# Patient Record
Sex: FEMALE | Race: WHITE | Employment: PART TIME | URBAN - NONMETROPOLITAN AREA
[De-identification: names, ages, dates, MRNs, and addresses within clinical notes are randomized per-mention and may not be internally consistent; named-entity substitution may affect disease eponyms.]

---

## 2023-02-20 ENCOUNTER — HOSPITAL ENCOUNTER (INPATIENT)
Age: 73
LOS: 1 days | Discharge: HOME | DRG: 343 | End: 2023-02-22
Attending: PEDIATRICS | Admitting: SURGERY
Payer: MEDICARE

## 2023-02-20 DIAGNOSIS — K35.80 ACUTE APPENDICITIS, UNSPECIFIED ACUTE APPENDICITIS TYPE: Primary | ICD-10-CM

## 2023-02-20 LAB
ALBUMIN SERPL-MCNC: 4.1 G/DL (ref 3.5–5.2)
ALP BLD-CCNC: 76 U/L (ref 35–104)
ALT SERPL-CCNC: 15 U/L (ref 5–33)
ANION GAP SERPL CALCULATED.3IONS-SCNC: 12 MMOL/L (ref 7–19)
AST SERPL-CCNC: 15 U/L (ref 5–32)
BASOPHILS ABSOLUTE: 0 K/UL (ref 0–0.2)
BASOPHILS RELATIVE PERCENT: 0.4 % (ref 0–1)
BILIRUB SERPL-MCNC: 0.7 MG/DL (ref 0.2–1.2)
BUN BLDV-MCNC: 8 MG/DL (ref 8–23)
CALCIUM SERPL-MCNC: 9.7 MG/DL (ref 8.8–10.2)
CHLORIDE BLD-SCNC: 102 MMOL/L (ref 98–111)
CO2: 23 MMOL/L (ref 22–29)
CREAT SERPL-MCNC: 0.7 MG/DL (ref 0.5–0.9)
EOSINOPHILS ABSOLUTE: 0 K/UL (ref 0–0.6)
EOSINOPHILS RELATIVE PERCENT: 0.4 % (ref 0–5)
GFR SERPL CREATININE-BSD FRML MDRD: >60 ML/MIN/{1.73_M2}
GLUCOSE BLD-MCNC: 109 MG/DL (ref 74–109)
HCT VFR BLD CALC: 48.9 % (ref 37–47)
HEMOGLOBIN: 16.4 G/DL (ref 12–16)
IMMATURE GRANULOCYTES #: 0 K/UL
LIPASE: 18 U/L (ref 13–60)
LYMPHOCYTES ABSOLUTE: 1.5 K/UL (ref 1.1–4.5)
LYMPHOCYTES RELATIVE PERCENT: 14.5 % (ref 20–40)
MCH RBC QN AUTO: 29 PG (ref 27–31)
MCHC RBC AUTO-ENTMCNC: 33.5 G/DL (ref 33–37)
MCV RBC AUTO: 86.5 FL (ref 81–99)
MONOCYTES ABSOLUTE: 0.6 K/UL (ref 0–0.9)
MONOCYTES RELATIVE PERCENT: 5.5 % (ref 0–10)
NEUTROPHILS ABSOLUTE: 8.2 K/UL (ref 1.5–7.5)
NEUTROPHILS RELATIVE PERCENT: 78.9 % (ref 50–65)
PDW BLD-RTO: 14.2 % (ref 11.5–14.5)
PLATELET # BLD: 228 K/UL (ref 130–400)
PMV BLD AUTO: 9.8 FL (ref 9.4–12.3)
POTASSIUM SERPL-SCNC: 4.3 MMOL/L (ref 3.5–5)
RBC # BLD: 5.65 M/UL (ref 4.2–5.4)
SODIUM BLD-SCNC: 137 MMOL/L (ref 136–145)
TOTAL PROTEIN: 7.2 G/DL (ref 6.6–8.7)
WBC # BLD: 10.4 K/UL (ref 4.8–10.8)

## 2023-02-20 PROCEDURE — 36415 COLL VENOUS BLD VENIPUNCTURE: CPT

## 2023-02-20 PROCEDURE — 80053 COMPREHEN METABOLIC PANEL: CPT

## 2023-02-20 PROCEDURE — 85025 COMPLETE CBC W/AUTO DIFF WBC: CPT

## 2023-02-20 PROCEDURE — 83690 ASSAY OF LIPASE: CPT

## 2023-02-20 PROCEDURE — 99285 EMERGENCY DEPT VISIT HI MDM: CPT

## 2023-02-20 RX ORDER — ERGOCALCIFEROL 1.25 MG/1
50000 CAPSULE ORAL WEEKLY
COMMUNITY

## 2023-02-20 RX ORDER — LORATADINE 10 MG/1
10 CAPSULE, LIQUID FILLED ORAL DAILY
COMMUNITY

## 2023-02-20 RX ORDER — ATORVASTATIN CALCIUM 10 MG/1
10 TABLET, FILM COATED ORAL DAILY
COMMUNITY

## 2023-02-21 ENCOUNTER — ANESTHESIA EVENT (OUTPATIENT)
Dept: OPERATING ROOM | Age: 73
End: 2023-02-21
Payer: MEDICARE

## 2023-02-21 ENCOUNTER — ANESTHESIA (OUTPATIENT)
Dept: OPERATING ROOM | Age: 73
End: 2023-02-21
Payer: MEDICARE

## 2023-02-21 ENCOUNTER — APPOINTMENT (OUTPATIENT)
Dept: CT IMAGING | Age: 73
DRG: 343 | End: 2023-02-21
Payer: MEDICARE

## 2023-02-21 PROBLEM — K35.80 ACUTE APPENDICITIS: Status: ACTIVE | Noted: 2023-02-21

## 2023-02-21 LAB
BACTERIA: NEGATIVE /HPF
BILIRUBIN URINE: NEGATIVE
BLOOD, URINE: NEGATIVE
CLARITY: ABNORMAL
COLOR: YELLOW
CRYSTALS, UA: ABNORMAL /HPF
EPITHELIAL CELLS, UA: 0 /HPF (ref 0–5)
GLUCOSE URINE: NEGATIVE MG/DL
HYALINE CASTS: 2 /HPF (ref 0–8)
KETONES, URINE: ABNORMAL MG/DL
LEUKOCYTE ESTERASE, URINE: ABNORMAL
NITRITE, URINE: NEGATIVE
PH UA: 6.5 (ref 5–8)
PROTEIN UA: NEGATIVE MG/DL
RBC UA: 1 /HPF (ref 0–4)
SARS-COV-2, NAAT: NOT DETECTED
SPECIFIC GRAVITY UA: 1.03 (ref 1–1.03)
UROBILINOGEN, URINE: 0.2 E.U./DL
WBC UA: 11 /HPF (ref 0–5)

## 2023-02-21 PROCEDURE — 6360000004 HC RX CONTRAST MEDICATION: Performed by: PEDIATRICS

## 2023-02-21 PROCEDURE — 2709999900 HC NON-CHARGEABLE SUPPLY: Performed by: SURGERY

## 2023-02-21 PROCEDURE — 6360000002 HC RX W HCPCS: Performed by: SURGERY

## 2023-02-21 PROCEDURE — 6360000002 HC RX W HCPCS: Performed by: PEDIATRICS

## 2023-02-21 PROCEDURE — 88304 TISSUE EXAM BY PATHOLOGIST: CPT

## 2023-02-21 PROCEDURE — 2700000000 HC OXYGEN THERAPY PER DAY

## 2023-02-21 PROCEDURE — 3600000014 HC SURGERY LEVEL 4 ADDTL 15MIN: Performed by: SURGERY

## 2023-02-21 PROCEDURE — 3600000004 HC SURGERY LEVEL 4 BASE: Performed by: SURGERY

## 2023-02-21 PROCEDURE — 2500000003 HC RX 250 WO HCPCS

## 2023-02-21 PROCEDURE — 7100000001 HC PACU RECOVERY - ADDTL 15 MIN: Performed by: SURGERY

## 2023-02-21 PROCEDURE — 87086 URINE CULTURE/COLONY COUNT: CPT

## 2023-02-21 PROCEDURE — 0DTJ4ZZ RESECTION OF APPENDIX, PERCUTANEOUS ENDOSCOPIC APPROACH: ICD-10-PCS | Performed by: SURGERY

## 2023-02-21 PROCEDURE — 6360000002 HC RX W HCPCS

## 2023-02-21 PROCEDURE — 44970 LAPAROSCOPY APPENDECTOMY: CPT | Performed by: SURGERY

## 2023-02-21 PROCEDURE — 74177 CT ABD & PELVIS W/CONTRAST: CPT | Performed by: RADIOLOGY

## 2023-02-21 PROCEDURE — 87635 SARS-COV-2 COVID-19 AMP PRB: CPT

## 2023-02-21 PROCEDURE — 94150 VITAL CAPACITY TEST: CPT

## 2023-02-21 PROCEDURE — 74177 CT ABD & PELVIS W/CONTRAST: CPT

## 2023-02-21 PROCEDURE — 0HB7XZX EXCISION OF ABDOMEN SKIN, EXTERNAL APPROACH, DIAGNOSTIC: ICD-10-PCS | Performed by: SURGERY

## 2023-02-21 PROCEDURE — 3700000001 HC ADD 15 MINUTES (ANESTHESIA): Performed by: SURGERY

## 2023-02-21 PROCEDURE — 2580000003 HC RX 258: Performed by: SURGERY

## 2023-02-21 PROCEDURE — 2580000003 HC RX 258: Performed by: PEDIATRICS

## 2023-02-21 PROCEDURE — 96374 THER/PROPH/DIAG INJ IV PUSH: CPT

## 2023-02-21 PROCEDURE — 88305 TISSUE EXAM BY PATHOLOGIST: CPT

## 2023-02-21 PROCEDURE — 7100000000 HC PACU RECOVERY - FIRST 15 MIN: Performed by: SURGERY

## 2023-02-21 PROCEDURE — 3700000000 HC ANESTHESIA ATTENDED CARE: Performed by: SURGERY

## 2023-02-21 PROCEDURE — 81001 URINALYSIS AUTO W/SCOPE: CPT

## 2023-02-21 PROCEDURE — 94760 N-INVAS EAR/PLS OXIMETRY 1: CPT

## 2023-02-21 PROCEDURE — 6370000000 HC RX 637 (ALT 250 FOR IP): Performed by: SURGERY

## 2023-02-21 PROCEDURE — 99223 1ST HOSP IP/OBS HIGH 75: CPT | Performed by: SURGERY

## 2023-02-21 PROCEDURE — 2720000010 HC SURG SUPPLY STERILE: Performed by: SURGERY

## 2023-02-21 PROCEDURE — 2580000003 HC RX 258

## 2023-02-21 PROCEDURE — 1210000000 HC MED SURG R&B

## 2023-02-21 PROCEDURE — 96375 TX/PRO/DX INJ NEW DRUG ADDON: CPT

## 2023-02-21 RX ORDER — FENTANYL CITRATE 50 UG/ML
25 INJECTION, SOLUTION INTRAMUSCULAR; INTRAVENOUS
Status: DISCONTINUED | OUTPATIENT
Start: 2023-02-21 | End: 2023-02-21 | Stop reason: HOSPADM

## 2023-02-21 RX ORDER — MORPHINE SULFATE 2 MG/ML
2 INJECTION, SOLUTION INTRAMUSCULAR; INTRAVENOUS
Status: DISCONTINUED | OUTPATIENT
Start: 2023-02-21 | End: 2023-02-22 | Stop reason: HOSPADM

## 2023-02-21 RX ORDER — SODIUM CHLORIDE 9 MG/ML
INJECTION, SOLUTION INTRAVENOUS PRN
Status: DISCONTINUED | OUTPATIENT
Start: 2023-02-21 | End: 2023-02-22 | Stop reason: HOSPADM

## 2023-02-21 RX ORDER — ACETAMINOPHEN 500 MG
1000 TABLET ORAL EVERY 8 HOURS SCHEDULED
Status: DISCONTINUED | OUTPATIENT
Start: 2023-02-21 | End: 2023-02-22 | Stop reason: HOSPADM

## 2023-02-21 RX ORDER — SODIUM CHLORIDE 0.9 % (FLUSH) 0.9 %
5-40 SYRINGE (ML) INJECTION EVERY 12 HOURS SCHEDULED
Status: DISCONTINUED | OUTPATIENT
Start: 2023-02-21 | End: 2023-02-21 | Stop reason: HOSPADM

## 2023-02-21 RX ORDER — ACETAMINOPHEN 325 MG/1
650 TABLET ORAL EVERY 4 HOURS PRN
Status: DISCONTINUED | OUTPATIENT
Start: 2023-02-21 | End: 2023-02-21

## 2023-02-21 RX ORDER — PROCHLORPERAZINE EDISYLATE 5 MG/ML
5 INJECTION INTRAMUSCULAR; INTRAVENOUS
Status: DISCONTINUED | OUTPATIENT
Start: 2023-02-21 | End: 2023-02-21 | Stop reason: HOSPADM

## 2023-02-21 RX ORDER — 0.9 % SODIUM CHLORIDE 0.9 %
1000 INTRAVENOUS SOLUTION INTRAVENOUS ONCE
Status: COMPLETED | OUTPATIENT
Start: 2023-02-21 | End: 2023-02-21

## 2023-02-21 RX ORDER — ROCURONIUM BROMIDE 10 MG/ML
INJECTION, SOLUTION INTRAVENOUS PRN
Status: DISCONTINUED | OUTPATIENT
Start: 2023-02-21 | End: 2023-02-21 | Stop reason: SDUPTHER

## 2023-02-21 RX ORDER — MAGNESIUM SULFATE IN WATER 40 MG/ML
2000 INJECTION, SOLUTION INTRAVENOUS PRN
Status: DISCONTINUED | OUTPATIENT
Start: 2023-02-21 | End: 2023-02-22 | Stop reason: HOSPADM

## 2023-02-21 RX ORDER — SODIUM CHLORIDE 9 MG/ML
INJECTION, SOLUTION INTRAVENOUS PRN
Status: DISCONTINUED | OUTPATIENT
Start: 2023-02-21 | End: 2023-02-21 | Stop reason: HOSPADM

## 2023-02-21 RX ORDER — FENTANYL CITRATE 50 UG/ML
50 INJECTION, SOLUTION INTRAMUSCULAR; INTRAVENOUS
Status: DISCONTINUED | OUTPATIENT
Start: 2023-02-21 | End: 2023-02-21 | Stop reason: HOSPADM

## 2023-02-21 RX ORDER — ENOXAPARIN SODIUM 100 MG/ML
40 INJECTION SUBCUTANEOUS DAILY
Status: DISCONTINUED | OUTPATIENT
Start: 2023-02-21 | End: 2023-02-22 | Stop reason: HOSPADM

## 2023-02-21 RX ORDER — MIDAZOLAM HYDROCHLORIDE 2 MG/2ML
2 INJECTION, SOLUTION INTRAMUSCULAR; INTRAVENOUS
Status: DISCONTINUED | OUTPATIENT
Start: 2023-02-21 | End: 2023-02-21 | Stop reason: HOSPADM

## 2023-02-21 RX ORDER — SODIUM CHLORIDE, SODIUM LACTATE, POTASSIUM CHLORIDE, CALCIUM CHLORIDE 600; 310; 30; 20 MG/100ML; MG/100ML; MG/100ML; MG/100ML
INJECTION, SOLUTION INTRAVENOUS CONTINUOUS PRN
Status: DISCONTINUED | OUTPATIENT
Start: 2023-02-21 | End: 2023-02-21 | Stop reason: SDUPTHER

## 2023-02-21 RX ORDER — SODIUM CHLORIDE 0.9 % (FLUSH) 0.9 %
5-40 SYRINGE (ML) INJECTION PRN
Status: DISCONTINUED | OUTPATIENT
Start: 2023-02-21 | End: 2023-02-21 | Stop reason: HOSPADM

## 2023-02-21 RX ORDER — MORPHINE SULFATE 2 MG/ML
2 INJECTION, SOLUTION INTRAMUSCULAR; INTRAVENOUS ONCE
Status: COMPLETED | OUTPATIENT
Start: 2023-02-21 | End: 2023-02-21

## 2023-02-21 RX ORDER — POTASSIUM CHLORIDE 7.45 MG/ML
10 INJECTION INTRAVENOUS PRN
Status: DISCONTINUED | OUTPATIENT
Start: 2023-02-21 | End: 2023-02-22 | Stop reason: HOSPADM

## 2023-02-21 RX ORDER — ONDANSETRON 2 MG/ML
INJECTION INTRAMUSCULAR; INTRAVENOUS PRN
Status: DISCONTINUED | OUTPATIENT
Start: 2023-02-21 | End: 2023-02-21 | Stop reason: SDUPTHER

## 2023-02-21 RX ORDER — ONDANSETRON 2 MG/ML
4 INJECTION INTRAMUSCULAR; INTRAVENOUS EVERY 6 HOURS PRN
Status: DISCONTINUED | OUTPATIENT
Start: 2023-02-21 | End: 2023-02-22 | Stop reason: HOSPADM

## 2023-02-21 RX ORDER — FENTANYL CITRATE 50 UG/ML
INJECTION, SOLUTION INTRAMUSCULAR; INTRAVENOUS PRN
Status: DISCONTINUED | OUTPATIENT
Start: 2023-02-21 | End: 2023-02-21 | Stop reason: SDUPTHER

## 2023-02-21 RX ORDER — OXYCODONE HYDROCHLORIDE 5 MG/1
5 TABLET ORAL EVERY 4 HOURS PRN
Status: DISCONTINUED | OUTPATIENT
Start: 2023-02-21 | End: 2023-02-22 | Stop reason: HOSPADM

## 2023-02-21 RX ORDER — LIDOCAINE HYDROCHLORIDE 10 MG/ML
1 INJECTION, SOLUTION EPIDURAL; INFILTRATION; INTRACAUDAL; PERINEURAL
Status: DISCONTINUED | OUTPATIENT
Start: 2023-02-21 | End: 2023-02-21 | Stop reason: HOSPADM

## 2023-02-21 RX ORDER — SODIUM CHLORIDE 0.9 % (FLUSH) 0.9 %
5-40 SYRINGE (ML) INJECTION EVERY 12 HOURS SCHEDULED
Status: DISCONTINUED | OUTPATIENT
Start: 2023-02-21 | End: 2023-02-22 | Stop reason: HOSPADM

## 2023-02-21 RX ORDER — FENTANYL CITRATE 50 UG/ML
25 INJECTION, SOLUTION INTRAMUSCULAR; INTRAVENOUS EVERY 5 MIN PRN
Status: DISCONTINUED | OUTPATIENT
Start: 2023-02-21 | End: 2023-02-21 | Stop reason: HOSPADM

## 2023-02-21 RX ORDER — ATORVASTATIN CALCIUM 10 MG/1
10 TABLET, FILM COATED ORAL DAILY
Status: DISCONTINUED | OUTPATIENT
Start: 2023-02-21 | End: 2023-02-22 | Stop reason: HOSPADM

## 2023-02-21 RX ORDER — PROPOFOL 10 MG/ML
INJECTION, EMULSION INTRAVENOUS PRN
Status: DISCONTINUED | OUTPATIENT
Start: 2023-02-21 | End: 2023-02-21 | Stop reason: SDUPTHER

## 2023-02-21 RX ORDER — SODIUM CHLORIDE 0.9 % (FLUSH) 0.9 %
5-40 SYRINGE (ML) INJECTION PRN
Status: DISCONTINUED | OUTPATIENT
Start: 2023-02-21 | End: 2023-02-22 | Stop reason: HOSPADM

## 2023-02-21 RX ORDER — DEXAMETHASONE SODIUM PHOSPHATE 10 MG/ML
INJECTION, SOLUTION INTRAMUSCULAR; INTRAVENOUS PRN
Status: DISCONTINUED | OUTPATIENT
Start: 2023-02-21 | End: 2023-02-21 | Stop reason: SDUPTHER

## 2023-02-21 RX ORDER — DIPHENHYDRAMINE HYDROCHLORIDE 50 MG/ML
12.5 INJECTION INTRAMUSCULAR; INTRAVENOUS
Status: DISCONTINUED | OUTPATIENT
Start: 2023-02-21 | End: 2023-02-21 | Stop reason: HOSPADM

## 2023-02-21 RX ORDER — ONDANSETRON 2 MG/ML
4 INJECTION INTRAMUSCULAR; INTRAVENOUS ONCE
Status: COMPLETED | OUTPATIENT
Start: 2023-02-21 | End: 2023-02-21

## 2023-02-21 RX ORDER — FENTANYL CITRATE 50 UG/ML
50 INJECTION, SOLUTION INTRAMUSCULAR; INTRAVENOUS EVERY 5 MIN PRN
Status: DISCONTINUED | OUTPATIENT
Start: 2023-02-21 | End: 2023-02-21 | Stop reason: HOSPADM

## 2023-02-21 RX ORDER — LIDOCAINE HYDROCHLORIDE 10 MG/ML
INJECTION, SOLUTION EPIDURAL; INFILTRATION; INTRACAUDAL; PERINEURAL PRN
Status: DISCONTINUED | OUTPATIENT
Start: 2023-02-21 | End: 2023-02-21 | Stop reason: SDUPTHER

## 2023-02-21 RX ORDER — MEPERIDINE HYDROCHLORIDE 25 MG/ML
12.5 INJECTION INTRAMUSCULAR; INTRAVENOUS; SUBCUTANEOUS EVERY 5 MIN PRN
Status: DISCONTINUED | OUTPATIENT
Start: 2023-02-21 | End: 2023-02-21 | Stop reason: HOSPADM

## 2023-02-21 RX ORDER — ONDANSETRON 2 MG/ML
4 INJECTION INTRAMUSCULAR; INTRAVENOUS
Status: DISCONTINUED | OUTPATIENT
Start: 2023-02-21 | End: 2023-02-21 | Stop reason: HOSPADM

## 2023-02-21 RX ADMIN — FENTANYL CITRATE 25 MCG: 50 INJECTION, SOLUTION INTRAMUSCULAR; INTRAVENOUS at 14:11

## 2023-02-21 RX ADMIN — PROPOFOL 120 MG: 10 INJECTION, EMULSION INTRAVENOUS at 13:21

## 2023-02-21 RX ADMIN — SODIUM CHLORIDE, SODIUM LACTATE, POTASSIUM CHLORIDE, AND CALCIUM CHLORIDE: 600; 310; 30; 20 INJECTION, SOLUTION INTRAVENOUS at 13:14

## 2023-02-21 RX ADMIN — SODIUM CHLORIDE, PRESERVATIVE FREE 10 ML: 5 INJECTION INTRAVENOUS at 22:19

## 2023-02-21 RX ADMIN — PIPERACILLIN AND TAZOBACTAM 3375 MG: 3; .375 INJECTION, POWDER, FOR SOLUTION INTRAVENOUS at 15:56

## 2023-02-21 RX ADMIN — FENTANYL CITRATE 50 MCG: 50 INJECTION, SOLUTION INTRAMUSCULAR; INTRAVENOUS at 13:41

## 2023-02-21 RX ADMIN — SODIUM CHLORIDE, SODIUM LACTATE, POTASSIUM CHLORIDE, AND CALCIUM CHLORIDE: 600; 310; 30; 20 INJECTION, SOLUTION INTRAVENOUS at 14:15

## 2023-02-21 RX ADMIN — SODIUM CHLORIDE 1000 ML: 9 INJECTION, SOLUTION INTRAVENOUS at 01:59

## 2023-02-21 RX ADMIN — IOPAMIDOL 90 ML: 755 INJECTION, SOLUTION INTRAVENOUS at 01:34

## 2023-02-21 RX ADMIN — FENTANYL CITRATE 25 MCG: 50 INJECTION, SOLUTION INTRAMUSCULAR; INTRAVENOUS at 14:33

## 2023-02-21 RX ADMIN — ONDANSETRON 4 MG: 2 INJECTION INTRAMUSCULAR; INTRAVENOUS at 01:59

## 2023-02-21 RX ADMIN — MORPHINE SULFATE 2 MG: 2 INJECTION, SOLUTION INTRAMUSCULAR; INTRAVENOUS at 05:53

## 2023-02-21 RX ADMIN — ATORVASTATIN CALCIUM 10 MG: 10 TABLET, FILM COATED ORAL at 11:46

## 2023-02-21 RX ADMIN — LIDOCAINE HYDROCHLORIDE 50 MG: 10 INJECTION, SOLUTION EPIDURAL; INFILTRATION; INTRACAUDAL; PERINEURAL at 13:21

## 2023-02-21 RX ADMIN — OXYCODONE HYDROCHLORIDE 5 MG: 5 TABLET ORAL at 15:59

## 2023-02-21 RX ADMIN — PIPERACILLIN AND TAZOBACTAM 3375 MG: 3; .375 INJECTION, POWDER, FOR SOLUTION INTRAVENOUS at 03:03

## 2023-02-21 RX ADMIN — FENTANYL CITRATE 50 MCG: 50 INJECTION, SOLUTION INTRAMUSCULAR; INTRAVENOUS at 13:14

## 2023-02-21 RX ADMIN — MORPHINE SULFATE 2 MG: 2 INJECTION, SOLUTION INTRAMUSCULAR; INTRAVENOUS at 02:00

## 2023-02-21 RX ADMIN — SUGAMMADEX 100 MG: 100 INJECTION, SOLUTION INTRAVENOUS at 14:19

## 2023-02-21 RX ADMIN — DEXAMETHASONE SODIUM PHOSPHATE 4 MG: 10 INJECTION, SOLUTION INTRAMUSCULAR; INTRAVENOUS at 13:23

## 2023-02-21 RX ADMIN — SUGAMMADEX 100 MG: 100 INJECTION, SOLUTION INTRAVENOUS at 14:17

## 2023-02-21 RX ADMIN — ROCURONIUM BROMIDE 50 MG: 10 INJECTION, SOLUTION INTRAVENOUS at 13:21

## 2023-02-21 RX ADMIN — ACETAMINOPHEN 1000 MG: 500 TABLET ORAL at 22:19

## 2023-02-21 RX ADMIN — ONDANSETRON 4 MG: 2 INJECTION INTRAMUSCULAR; INTRAVENOUS at 14:11

## 2023-02-21 RX ADMIN — MORPHINE SULFATE 2 MG: 2 INJECTION, SOLUTION INTRAMUSCULAR; INTRAVENOUS at 09:49

## 2023-02-21 RX ADMIN — PIPERACILLIN AND TAZOBACTAM 3375 MG: 3; .375 INJECTION, POWDER, FOR SOLUTION INTRAVENOUS at 09:46

## 2023-02-21 RX ADMIN — ACETAMINOPHEN 1000 MG: 500 TABLET ORAL at 15:55

## 2023-02-21 ASSESSMENT — ENCOUNTER SYMPTOMS
SHORTNESS OF BREATH: 0
SORE THROAT: 0
BACK PAIN: 0
EYE REDNESS: 0
DIARRHEA: 0
CHEST TIGHTNESS: 0
COUGH: 0
ABDOMINAL PAIN: 1
CONSTIPATION: 0
SHORTNESS OF BREATH: 0
COLOR CHANGE: 0
ABDOMINAL DISTENTION: 0
VOMITING: 0
EYE PAIN: 0
NAUSEA: 0

## 2023-02-21 ASSESSMENT — PAIN DESCRIPTION - ORIENTATION
ORIENTATION: RIGHT;LOWER
ORIENTATION: RIGHT;LOWER
ORIENTATION: RIGHT;LEFT;MID

## 2023-02-21 ASSESSMENT — PAIN SCALES - GENERAL
PAINLEVEL_OUTOF10: 7
PAINLEVEL_OUTOF10: 5
PAINLEVEL_OUTOF10: 3
PAINLEVEL_OUTOF10: 0
PAINLEVEL_OUTOF10: 2
PAINLEVEL_OUTOF10: 6

## 2023-02-21 ASSESSMENT — PAIN DESCRIPTION - DESCRIPTORS
DESCRIPTORS: ACHING
DESCRIPTORS: SHARP
DESCRIPTORS: CRAMPING

## 2023-02-21 ASSESSMENT — PAIN DESCRIPTION - PAIN TYPE: TYPE: ACUTE PAIN

## 2023-02-21 ASSESSMENT — PAIN DESCRIPTION - DIRECTION: RADIATING_TOWARDS: NO

## 2023-02-21 ASSESSMENT — PAIN DESCRIPTION - ONSET: ONSET: ON-GOING

## 2023-02-21 ASSESSMENT — LIFESTYLE VARIABLES: SMOKING_STATUS: 0

## 2023-02-21 ASSESSMENT — PAIN - FUNCTIONAL ASSESSMENT
PAIN_FUNCTIONAL_ASSESSMENT: ACTIVITIES ARE NOT PREVENTED
PAIN_FUNCTIONAL_ASSESSMENT: ACTIVITIES ARE NOT PREVENTED

## 2023-02-21 ASSESSMENT — PAIN DESCRIPTION - LOCATION
LOCATION: ABDOMEN

## 2023-02-21 ASSESSMENT — PAIN DESCRIPTION - FREQUENCY: FREQUENCY: INTERMITTENT

## 2023-02-21 NOTE — OP NOTE
Operative Note      Patient: Hans Smith  YOB: 1950  MRN: [id number]    Date of Procedure: 2/21/2023    Pre-Op Diagnosis: Acute appendicitis, unspecified acute appendicitis type [K35.80]    Post-Op Diagnosis:  Acute appendicitis with localized peritonitis, without gangrene or perforation or abscess       Procedure(s):  LAPAROSCOPIC APPENDECTOMY AND REMOVAL OF LOWER RIGHT  ABDOMINAL SKIN LESION    Surgeon(s):  Lana Workamn DO    Assistant:   First Assistant: Babak Villalta RN    Anesthesia: General    Estimated Blood Loss (mL): Minimal    Complications: None    Specimens:   ID Type Source Tests Collected by Time Destination   A : appendix Tissue Appendix 189 Janet , DO 6/83/1318 1253    B : lower abdominal lesion Tissue Abdomen SURGICAL PATHOLOGY Lana Workman, DO 6/39/2756 1344        Implants:  * No implants in log *      Drains:   [REMOVED] Urinary Catheter 02/21/23 2 Way (Removed)       Findings: inflamed, thickened appendix. Detailed Description of Procedure:     Ms. Adrianne Jolly was taken to the main operating room and placed in the supine position. After general anesthesia was administered, the abdomen was prepped and draped in the usual, sterile fashion. A rodriguez catheter was placed. A timeout was performed identifying the correct patient, procedure, preoperative antibiotics, and necessary available equipment. A small incision was made in the superior aspect of the umbilicus after instilling local anesthetic. Via Chica technique the abdomen was entered and insufflated via 12 mm Trocar. A 5 mm thirty-degree laparoscope was inserted and inspection undertaken. No injury from insertion was appreciated. In the right lower quadrant no purulence was seen and the appendix was not initially visible. Two, 5mm trocars were then inserted under direct visualization after local anesthetic was injected, in the right upper abdomen and in the left lower abdomen.   The patient was then rotated to the left and placed in reverse Trendelenburg position and the appendix was visualized. It appeared inflamed and thickened. Blunt and electrocautery dissection was used to free the appendix from lateral attachments. The mesoappendix was freed from the appendix and divided with the ligasure device. Further dissection was performed to reveal the base of the appendix at the level of the cecum. This was divided with endo-ROXANA blue load. Hemostasis was obtained. The appendix was then placed in a endoscopic retrieval bag and removed from the umbilical incision. The umbilical fascia was closed with 0-Vicryl suture. The abdomen was again visualized with 5mm scope, and hemostasis remained at the staple lines. The abdomen was desuflatted and all skin incisions were closed with 0000-Monocryl. Sponge, needle, and instrument count correct on 2 occasions. Estimated intraoperative blood loss: Minimal.    Ms. Chrystal Contreras tolerated her surgery well, and she was taken to PACU in satisfactory condition. ________________________________  Rita Olivo DO      Electronically signed by Rosibel Vazquez DO on 4/90/3037 at 2:30 PM

## 2023-02-21 NOTE — PLAN OF CARE
Problem: Discharge Planning  Goal: Discharge to home or other facility with appropriate resources  2/21/2023 1029 by Ronak Bianchi RN  Outcome: Progressing  2/21/2023 0527 by Marilee Helton RN  Outcome: Progressing  Flowsheets (Taken 2/21/2023 4686)  Discharge to home or other facility with appropriate resources:   Identify barriers to discharge with patient and caregiver   Arrange for needed discharge resources and transportation as appropriate   Identify discharge learning needs (meds, wound care, etc)   Refer to discharge planning if patient needs post-hospital services based on physician order or complex needs related to functional status, cognitive ability or social support system     Problem: ABCDS Injury Assessment  Goal: Absence of physical injury  2/21/2023 1029 by Ronak Bianchi RN  Outcome: Progressing  2/21/2023 0527 by Marilee Helton RN  Outcome: Progressing     Problem: Pain  Goal: Verbalizes/displays adequate comfort level or baseline comfort level  2/21/2023 1029 by Ronak Bianchi RN  Outcome: Progressing  2/21/2023 0527 by Marilee Helton RN  Outcome: Progressing

## 2023-02-21 NOTE — PROGRESS NOTES
4 Eyes Skin Assessment    Watson Demarco is being assessed upon: Admission    I agree that I, Jenn Banks, RN, along with Nafisa Royal RN (either 2 RN's or 1 LPN and 1 RN) have performed a thorough Head to Toe Skin Assessment on the patient. ALL assessment sites listed below have been assessed. Areas assessed by both nurses:     [x]   Head, Face, and Ears   [x]   Shoulders, Back, and Chest  [x]   Arms, Elbows, and Hands   [x]   Coccyx, Sacrum, and Ischium  [x]   Legs, Feet, and Heels    Does the Patient have Skin Breakdown?  No    Mehran Prevention initiated: NA  Wound Care Orders initiated: NA    WOC nurse consulted for Pressure Injury (Stage 3,4, Unstageable, DTI, NWPT, and Complex wounds) and New or Established Ostomies: NA        Primary Nurse eSignature: Anika Alexander RN on 2/21/2023 at 5:16 AM      Co-Signer eSignature: Electronically signed by Nafisa Royal RN on 2/21/23 at 5:21 AM CST

## 2023-02-21 NOTE — CARE COORDINATION
WRIGHT Letter given to patient. Reviewed, discussed, answered all questions, and signed by patient. Signed copy placed in patient's chart.      02/21/23 1132   IMM Letter   Observation Status Letter date given: 02/21/23   Observation Status Letter time given: 1115   Observation Status Letter given to Patient/Family/Significant other/Guardian/POA/by: given to patient by Lieutenant Rodrick BENTON BSN CM   Electronically signed by Gilford Boroughs, RN, BSN on 2/21/2023 at 11:33 AM

## 2023-02-21 NOTE — PROGRESS NOTES
Kacey Krishnamurthy arrived to room # [id number]. Presented with: acute appendicitis  Mental Status: Patient is oriented, alert, coherent, logical, thought processes intact, and able to concentrate and follow conversation. Vitals:    02/21/23 0500   BP: (!) 105/58   Pulse: 69   Resp: 18   Temp: 97.7 °F (36.5 °C)   SpO2: 93%     Patient safety contract and falls prevention contract reviewed with patient Yes. Oriented Patient to room. Call light within reach. Yes.   Needs, issues or concerns expressed at this time: no.      Electronically signed by Betty Larry RN on 2/21/2023 at 5:15 AM

## 2023-02-21 NOTE — H&P
[facility] History & Physical    Chief Complaint:  Chief Complaint   Patient presents with    Abdominal Pain     RLQ and suprapubic pain x2 days       SUBJECTIVE:  Ms. Venkat Montoya is a 68 y.o. female who presents today with pain int he right lower abdomen that started . She states the pain is sharp and localized to the right lower abdomen without radiation. She notes a decreased appetite yesterday but it is improved today with pain medication. She has never had this pain in the past. Denies fever, chills, diarrhea, nausea, vomiting. Past Medical History:   Diagnosis Date    Hyperlipidemia     Vitamin D deficiency      Past Surgical History:   Procedure Laterality Date    BREAST ENHANCEMENT SURGERY       SECTION      SKIN SURGERY Right     knee     Current Facility-Administered Medications   Medication Dose Route Frequency Provider Last Rate Last Admin    sodium chloride flush 0.9 % injection 5-40 mL  5-40 mL IntraVENous 2 times per day Myrna Olivo, DO        sodium chloride flush 0.9 % injection 5-40 mL  5-40 mL IntraVENous PRN Myrna Olivo, DO        0.9 % sodium chloride infusion   IntraVENous PRN Myrna Olivo, DO        enoxaparin (LOVENOX) injection 40 mg  40 mg SubCUTAneous Daily Myrna Olivo, DO        acetaminophen (TYLENOL) tablet 650 mg  650 mg Oral Y6V PRN Myrna Olivo, DO        morphine (PF) injection 2 mg  2 mg IntraVENous Q0N PRN Myrna Olivo, DO   2 mg at 23 0949    ondansetron (ZOFRAN) injection 4 mg  4 mg IntraVENous B9K PRN Myrna Olivo, DO        piperacillin-tazobactam (ZOSYN) 3,375 mg in sodium chloride 0.9 % 50 mL IVPB ([id number])  3,375 mg IntraVENous Q8L Myrna Olivo  mL/hr at 23 0946 3,375 mg at 23 0946    atorvastatin (LIPITOR) tablet 10 mg  10 mg Oral Daily Myrna Olivo DO         Allergies: Patient has no known allergies. History reviewed. No pertinent family history.     Social History     Tobacco Use    Smoking status: Never    Smokeless tobacco: Never   Substance Use Topics    Alcohol use: Yes     Comment: rare       Review of Systems   Constitutional:  Negative for fatigue, fever and unexpected weight change. HENT:  Negative for hearing loss, nosebleeds and sore throat. Eyes:  Negative for pain, redness and visual disturbance. Respiratory:  Negative for cough, chest tightness and shortness of breath. Cardiovascular:  Negative for chest pain, palpitations and leg swelling. Gastrointestinal:  Positive for abdominal pain. Negative for abdominal distention, constipation, diarrhea, nausea and vomiting. Endocrine: Negative for cold intolerance, heat intolerance and polydipsia. Genitourinary:  Negative for difficulty urinating, frequency and urgency. Musculoskeletal:  Negative for back pain, joint swelling and neck pain. Skin:  Negative for color change, rash and wound. Allergic/Immunologic: Negative for environmental allergies and food allergies. Neurological:  Negative for seizures, light-headedness and headaches. Hematological:  Negative for adenopathy. Does not bruise/bleed easily. Psychiatric/Behavioral:  Negative for confusion, sleep disturbance and suicidal ideas. OBJECTIVE:  /66   Pulse 79   Temp 98.8 °F (37.1 °C) (Oral)   Resp 16   Ht 5' 4\" (1.626 m)   Wt 170 lb (77.1 kg)   SpO2 92%   BMI 29.18 kg/m²   CONSTITUTIONAL: Alert, appropriate, no acute distress  SKIN: warm, dry with no rashes. SK to right lower abdomen. HEENT: NCAT, Non icteric, PERR. Trachea Midline. CHEST/LUNGS: CTA bilaterally. Normal respiratory effort. CARDIOVASCULAR: RRR, No edema. ABDOMEN: soft, ND, TTP in RLQ, +Mcburney's point pain, +BS  NEUROLOGIC: CN II-XI grossly intact, no motor or sensory deficits   MUSCULOSKELETAL: No clubbing or cyanosis. PSYCHIATRIC:  Normal Mood and Affect. Alert and Oriented.     LABS:  CBC:   Recent Labs     02/20/23 2012   WBC 10.4   HGB 16.4*        BMP: Recent Labs     02/20/23 2012      K 4.3      CO2 23   BUN 8   CREATININE 0.7   GLUCOSE 109       ASSESSMENT:  Principal Problem:    Acute appendicitis  Resolved Problems:    * No resolved hospital problems. *      PLAN:  The risks, benefits, and alternatives were discussed with the pt. She is willing to accept the risks and proceed with a laparoscopic appendectomy. She also requests excision of her abdominal wall SK. The surgical risks included but not limited to bleeding, infection, perforation, risk of needing further surgery, etc. The anesthetic risks included heart attacks, strokes, pneumonia, phlebitis, etc.  She is willing to accept all risks and proceed with surgery. No guarantees have been given.       An Justin DO   Electronically Signed on 2/21/2023 at 10:14 AM

## 2023-02-21 NOTE — PROGRESS NOTES
Pharmacy Adjustment per [address] protocol    Donovan Faria is a 68 y.o. female. Pharmacy has adjusted medications per [address] protocol. Recent Labs     02/20/23 2012   BUN 8       Recent Labs     02/20/23 2012   CREATININE 0.7       Estimated Creatinine Clearance: 72 mL/min (based on SCr of 0.7 mg/dL). Height:   Ht Readings from Last 1 Encounters:   02/20/23 5' 4\" (1.626 m)     Weight:  Wt Readings from Last 1 Encounters:   02/20/23 170 lb (77.1 kg)         Plan: Adjust the following medications based on [address] protocol:           Zosyn 3.375 gm IV every six hours standard infusion adjusted to Zosyn 3.375 gm IV every eight hours extended infusion.     Electronically signed by Yelena Faust, [address] [place] [address] on 2/21/2023 at 10:42 AM

## 2023-02-21 NOTE — ANESTHESIA POSTPROCEDURE EVALUATION
Department of Anesthesiology  Postprocedure Note    Patient: Anirudh Bowen  MRN: [id number]  YOB: 1950  Date of evaluation: 2/21/2023      Procedure Summary     Date: 02/21/23 Room / Location: [address]    Anesthesia Start: 2918 Anesthesia Stop:     Procedure: LAPAROSCOPIC APPENDECTOMY AND REMOVAL OF LOWER RIGHT  ABDOMINAL SKIN LESION Diagnosis:       Acute appendicitis, unspecified acute appendicitis type      (Acute appendicitis, unspecified acute appendicitis type [L82.82])    Surgeons: Nicky Marcum DO Responsible Provider: TRACEE Nava CRNA    Anesthesia Type: general ASA Status: 2          Anesthesia Type: No value filed.     Hannah Phase I: Hannah Score: 10    Hannah Phase II:        Anesthesia Post Evaluation    Comments: /66   Pulse 79   Temp 98.8 °F (37.1 °C) (Temporal)   Resp 14   Ht 5' 4\" (1.626 m)   Wt 170 lb (77.1 kg)   SpO2 92%   BMI 29.18 kg/m²

## 2023-02-21 NOTE — ANESTHESIA PRE PROCEDURE
Department of Anesthesiology  Preprocedure Note       Name:  Willie Farias   Age:  68 y.o.  :  1950                                          MRN:  [id number]         Date:  2023      Surgeon: Renato Bishop):  Finn Watson DO    Procedure: Procedure(s):  APPENDECTOMY LAPAROSCOPIC    Medications prior to admission:   Prior to Admission medications    Medication Sig Start Date End Date Taking?  Authorizing Provider   atorvastatin (LIPITOR) 10 MG tablet Take 10 mg by mouth daily   Yes Historical Provider, MD   loratadine (CLARITIN) 10 MG capsule Take 10 mg by mouth daily   Yes Historical Provider, MD   vitamin D (ERGOCALCIFEROL) 1.25 MG (89892 UT) CAPS capsule Take 50,000 Units by mouth once a week   Yes Historical Provider, MD       Current medications:    Current Facility-Administered Medications   Medication Dose Route Frequency Provider Last Rate Last Admin    [MAR Hold] sodium chloride flush 0.9 % injection 5-40 mL  5-40 mL IntraVENous 2 times per day Myrna Olivo DO        [facility] Hold] sodium chloride flush 0.9 % injection 5-40 mL  5-40 mL IntraVENous PRN Myrna Olivo DO        [MAR Hold] 0.9 % sodium chloride infusion   IntraVENous PRN Myrna Olivo DO        [MAR Hold] enoxaparin (LOVENOX) injection 40 mg  40 mg SubCUTAneous Daily Myrna Olivo DO        [MAR Hold] acetaminophen (TYLENOL) tablet 650 mg  650 mg Oral F6N PRN Myrna Olivo DO        [MAR Hold] morphine (PF) injection 2 mg  2 mg IntraVENous Y4S PRN Myrna Olivo DO   2 mg at 23 0949    [MAR Hold] ondansetron (ZOFRAN) injection 4 mg  4 mg IntraVENous K9F PRN Myrna Olivo DO        [MAR Hold] atorvastatin (LIPITOR) tablet 10 mg  10 mg Oral Daily Myrna Olivo DO   10 mg at 23 1146    [MAR Hold] piperacillin-tazobactam (ZOSYN) 3,375 mg in sodium chloride 0.9 % 50 mL IVPB (Nopo6Npx)  3,375 mg IntraVENous N5T Myrna Olivo DO           Allergies:  No Known Allergies    Problem List:    Patient Active Problem List   Diagnosis Code    Acute appendicitis K35.80       Past Medical History:        Diagnosis Date    Hyperlipidemia     Vitamin D deficiency        Past Surgical History:        Procedure Laterality Date    BREAST ENHANCEMENT SURGERY       SECTION      SKIN SURGERY Right     knee       Social History:    Social History     Tobacco Use    Smoking status: Never    Smokeless tobacco: Never   Substance Use Topics    Alcohol use: Yes     Comment: rare                                Counseling given: Not Answered      Vital Signs (Current):   Vitals:    23 0553 23 0623 23 0803 23 1019   BP:   110/66    Pulse:   79    Resp: 16 16  14   Temp:   98.8 °F (37.1 °C)    TempSrc:   Oral    SpO2:   92%    Weight:       Height:                                                  BP Readings from Last 3 Encounters:   23 110/66       NPO Status: Time of last liquid consumption:                         Time of last solid consumption:                         Date of last liquid consumption: 23                        Date of last solid food consumption: 23    BMI:   Wt Readings from Last 3 Encounters:   23 170 lb (77.1 kg)     Body mass index is 29.18 kg/m².     CBC:   Lab Results   Component Value Date/Time    WBC 10.4 2023 08:12 PM    RBC 5.65 2023 08:12 PM    HGB 16.4 2023 08:12 PM    HCT 48.9 2023 08:12 PM    MCV 86.5 2023 08:12 PM    RDW 14.2 2023 08:12 PM     2023 08:12 PM       CMP:   Lab Results   Component Value Date/Time     2023 08:12 PM    K 4.3 2023 08:12 PM     2023 08:12 PM    CO2 23 2023 08:12 PM    BUN 8 2023 08:12 PM    CREATININE 0.7 2023 08:12 PM    LABGLOM >60 2023 08:12 PM    GLUCOSE 109 2023 08:12 PM    PROT 7.2 2023 08:12 PM    CALCIUM 9.7 2023 08:12 PM    BILITOT 0.7 2023 08:12 PM    ALKPHOS 76 2023 08:12 PM    AST 15 02/20/2023 08:12 PM    ALT 15 02/20/2023 08:12 PM       POC Tests: No results for input(s): POCGLU, POCNA, POCK, POCCL, POCBUN, POCHEMO, POCHCT in the last 72 hours. Coags: No results found for: PROTIME, INR, APTT    HCG (If Applicable): No results found for: PREGTESTUR, PREGSERUM, HCG, HCGQUANT     ABGs: No results found for: PHART, PO2ART, QIX7PQZ, RLD6YOG, BEART, T8LFNHBE     Type & Screen (If Applicable):  No results found for: LABABO, LABRH    Drug/Infectious Status (If Applicable):  No results found for: HIV, HEPCAB    COVID-19 Screening (If Applicable):   Lab Results   Component Value Date/Time    COVID19 Not Detected 02/21/2023 02:01 AM           Anesthesia Evaluation  Patient summary reviewed and Nursing notes reviewed no history of anesthetic complications:   Airway: Mallampati: II  TM distance: >3 FB   Neck ROM: full  Mouth opening: > = 3 FB   Dental: normal exam         Pulmonary:   (+) sleep apnea: on CPAP,      (-) shortness of breath and not a current smoker                           Cardiovascular:  Exercise tolerance: good (>4 METS),   (+) hyperlipidemia    (-) pacemaker, hypertension, past MI, CAD, CABG/stent, dysrhythmias and  angina       Beta Blocker:  Not on Beta Blocker         Neuro/Psych:      (-) seizures and CVA           GI/Hepatic/Renal:        (-) GERD, liver disease and no renal disease      ROS comment: +appendicitis. Endo/Other:    (+) no malignancy/cancer. (-) diabetes mellitus, blood dyscrasia, no malignancy/cancer               Abdominal:             Vascular:     - DVT and PE. Other Findings:           Anesthesia Plan      general     ASA 2       Induction: intravenous. MIPS: Postoperative opioids intended and Prophylactic antiemetics administered. Anesthetic plan and risks discussed with patient.                         Rhonda Reis DO   2/21/2023

## 2023-02-21 NOTE — ANESTHESIA POSTPROCEDURE EVALUATION
Department of Anesthesiology  Postprocedure Note    Patient: Marina Elizondo  MRN: [id number]  YOB: 1950  Date of evaluation: 2/21/2023      Procedure Summary     Date: 02/21/23 Room / Location: [address]    Anesthesia Start: 6719 Anesthesia Stop: 0387    Procedure: LAPAROSCOPIC APPENDECTOMY AND REMOVAL OF LOWER RIGHT  ABDOMINAL SKIN LESION Diagnosis:       Acute appendicitis, unspecified acute appendicitis type      (Acute appendicitis, unspecified acute appendicitis type [G48.42])    Surgeons: Tricia Gonzalez DO Responsible Provider: TRACEE Watters CRNA    Anesthesia Type: general ASA Status: 2          Anesthesia Type: No value filed.     Hannah Phase I: Hannah Score: 8    Hannah Phase II:        Anesthesia Post Evaluation    Patient location during evaluation: PACU  Patient participation: waiting for patient participation  Level of consciousness: sleepy but conscious  Pain score: 0  Airway patency: patent  Nausea & Vomiting: no nausea and no vomiting  Complications: no  Cardiovascular status: blood pressure returned to baseline  Respiratory status: acceptable and nasal cannula  Hydration status: euvolemic

## 2023-02-22 VITALS
WEIGHT: 170 LBS | TEMPERATURE: 98.4 F | HEART RATE: 54 BPM | DIASTOLIC BLOOD PRESSURE: 56 MMHG | RESPIRATION RATE: 18 BRPM | OXYGEN SATURATION: 92 % | SYSTOLIC BLOOD PRESSURE: 96 MMHG | HEIGHT: 64 IN | BODY MASS INDEX: 29.02 KG/M2

## 2023-02-22 PROBLEM — K35.30 ACUTE APPENDICITIS WITH LOCALIZED PERITONITIS, WITHOUT PERFORATION, ABSCESS, OR GANGRENE: Status: ACTIVE | Noted: 2023-02-21

## 2023-02-22 PROCEDURE — 99024 POSTOP FOLLOW-UP VISIT: CPT | Performed by: SURGERY

## 2023-02-22 PROCEDURE — 6370000000 HC RX 637 (ALT 250 FOR IP): Performed by: SURGERY

## 2023-02-22 PROCEDURE — 94760 N-INVAS EAR/PLS OXIMETRY 1: CPT

## 2023-02-22 PROCEDURE — 2580000003 HC RX 258: Performed by: SURGERY

## 2023-02-22 PROCEDURE — 6360000002 HC RX W HCPCS: Performed by: SURGERY

## 2023-02-22 RX ADMIN — PIPERACILLIN AND TAZOBACTAM 3375 MG: 3; .375 INJECTION, POWDER, FOR SOLUTION INTRAVENOUS at 01:01

## 2023-02-22 RX ADMIN — SODIUM CHLORIDE, PRESERVATIVE FREE 10 ML: 5 INJECTION INTRAVENOUS at 08:05

## 2023-02-22 RX ADMIN — ATORVASTATIN CALCIUM 10 MG: 10 TABLET, FILM COATED ORAL at 08:02

## 2023-02-22 RX ADMIN — PIPERACILLIN AND TAZOBACTAM 3375 MG: 3; .375 INJECTION, POWDER, FOR SOLUTION INTRAVENOUS at 08:05

## 2023-02-22 RX ADMIN — ACETAMINOPHEN 1000 MG: 500 TABLET ORAL at 05:10

## 2023-02-22 RX ADMIN — ENOXAPARIN SODIUM 40 MG: 100 INJECTION SUBCUTANEOUS at 08:02

## 2023-02-22 ASSESSMENT — PAIN DESCRIPTION - LOCATION: LOCATION: ABDOMEN

## 2023-02-22 ASSESSMENT — PAIN SCALES - GENERAL: PAINLEVEL_OUTOF10: 3

## 2023-02-22 ASSESSMENT — PAIN - FUNCTIONAL ASSESSMENT: PAIN_FUNCTIONAL_ASSESSMENT: ACTIVITIES ARE NOT PREVENTED

## 2023-02-22 ASSESSMENT — PAIN DESCRIPTION - DESCRIPTORS: DESCRIPTORS: ACHING

## 2023-02-22 ASSESSMENT — PAIN DESCRIPTION - ORIENTATION: ORIENTATION: RIGHT;LEFT

## 2023-02-22 NOTE — DISCHARGE SUMMARY
Physician Discharge Summary     Patient ID:  Judith Ortez  [id number]  18 y.o.  1950    Admit date: 2/20/2023    Discharge date and time: No discharge date for patient encounter. Admitting Physician: April Roth DO     Discharge Physician: Muriel Pinto DO     Admission Diagnoses: Acute appendicitis [K35.80]  Acute appendicitis, unspecified acute appendicitis type [K35.80]    Discharge Diagnoses: Same    Admission Condition: fair    Discharged Condition: good    Indication for Admission: Surgery    Hospital Course: Mrs. Evelyn Baer presented to the emergency room with abdominal pain. She was found to have appendicitis and was taken to the operating room for laparoscopic appendectomy. She tolerated the procedure well and is stable for discharge.      Consults: none    Significant Diagnostic Studies: see chart    Treatments: IV hydration, antibiotics: Zosyn, analgesia: acetaminophen and Morphine, and surgery: laparoscopic apppendectomy    Discharge Exam:  BP (!) 81/45   Pulse 53   Temp 98.4 °F (36.9 °C) (Temporal)   Resp 18   Ht 5' 4\" (1.626 m)   Wt 170 lb (77.1 kg)   SpO2 91%   BMI 29.18 kg/m²   General appearance: alert, appears stated age, and cooperative  Head: Normocephalic, without obvious abnormality, atraumatic  Abdomen: soft, non-tender; bowel sounds normal; no masses,  no organomegaly and incisions C/D/I    Disposition: home    In process/preliminary results:  Outstanding Order Results       Date and Time Order Name Status Description    2/21/2023 11:53 AM Surgical Pathology In process     2/21/2023  2:50 AM Culture, Urine Preliminary             Patient Instructions:   Current Discharge Medication List        CONTINUE these medications which have NOT CHANGED    Details   atorvastatin (LIPITOR) 10 MG tablet Take 10 mg by mouth daily      loratadine (CLARITIN) 10 MG capsule Take 10 mg by mouth daily      vitamin D (ERGOCALCIFEROL) 1.25 MG (19188 UT) CAPS capsule Take 50,000 Units by mouth once a week           Activity: activity as tolerated and no heavy lifting for 2 weeks  Diet: regular diet  Wound Care: as directed    Follow-up with Dr Sam Oconnell in 2 weeks.     Nano Blanton,    6/98/2142  8:31 AM

## 2023-02-22 NOTE — PLAN OF CARE
Problem: Discharge Planning  Goal: Discharge to home or other facility with appropriate resources  2/22/2023 0955 by Kimberlyn Campo RN  Outcome: Progressing  2/22/2023 0022 by Dave Miles RN  Outcome: Progressing  Flowsheets (Taken 2/21/2023 1926)  Discharge to home or other facility with appropriate resources:   Identify barriers to discharge with patient and caregiver   Identify discharge learning needs (meds, wound care, etc)   Arrange for needed discharge resources and transportation as appropriate   Refer to discharge planning if patient needs post-hospital services based on physician order or complex needs related to functional status, cognitive ability or social support system     Problem: ABCDS Injury Assessment  Goal: Absence of physical injury  2/22/2023 0955 by Kimberlyn Campo RN  Outcome: Progressing  2/22/2023 0022 by Dave Miles RN  Outcome: Progressing  Flowsheets (Taken 2/22/2023 0020)  Absence of Physical Injury: Implement safety measures based on patient assessment     Problem: Pain  Goal: Verbalizes/displays adequate comfort level or baseline comfort level  2/22/2023 0955 by Kimberlyn Campo RN  Outcome: Progressing  2/22/2023 0022 by Dave Miles RN  Outcome: Progressing

## 2023-02-22 NOTE — PROGRESS NOTES
[id number] [facility] General Surgery Progress Note    Chief Complaint:  Chief Complaint   Patient presents with    Abdominal Pain     RLQ and suprapubic pain x2 days       POD # 1    S: Doing well. Showered this morning. Incisional pain, tolerating clear liquids. O:   BP (!) 81/45   Pulse 53   Temp 98.4 °F (36.9 °C) (Temporal)   Resp 18   Ht 5' 4\" (1.626 m)   Wt 170 lb (77.1 kg)   SpO2 91%   BMI 29.18 kg/m²   I/O reviewed and appropriate  CONSTITUTIONAL: Alert, appropriate, no acute distress  SKIN: warm, dry with no rashes or lesions  HEENT: NCAT, Non icteric, PERR. Trachea Midline. CHEST/LUNGS: CTA bilaterally. Normal respiratory effort. CARDIOVASCULAR: RRR, No edema. ABDOMEN: soft, ND, appropriately TTP, +BS  Incisions: Clean, dry, and intact with no erythema or induration  NEUROLOGIC: CN II-XI grossly intact, no motor or sensory deficits   MUSCULOSKELETAL: No clubbing or cyanosis. PSYCHIATRIC:  Normal Mood and Affect. Alert and Oriented. LABS:   CBC:   Recent Labs     02/20/23 2012   WBC 10.4   RBC 5.65*   HGB 16.4*   HCT 48.9*      LYMPHOPCT 14.5*   MONOPCT 5.5   BASOPCT 0.4   MONOSABS 0.60   LYMPHSABS 1.5   EOSABS 0.00   BASOSABS 0.00      BMP:   Recent Labs     02/20/23 2012      K 4.3      CO2 23   ANIONGAP 12   GLUCOSE 109   CREATININE 0.7   LABGLOM >60   CALCIUM 9.7     LFT:   Recent Labs     02/20/23 2012   PROT 7.2   LABALBU 4.1   BILITOT 0.7   ALKPHOS 76   ALT 15   AST 15       A: Principal Problem:    Acute appendicitis with localized peritonitis, without perforation, abscess, or gangrene  Resolved Problems:    * No resolved hospital problems. *      P:   Surgically stable for discharge. May follow with me in the office in two weeks. Patient has declined pain medication prescription. Instructed her that if she changes her mind she can call the office.        Mateo Desir DO   Electronically Signed on 2/22/2023 at 8:30 AM

## 2023-02-22 NOTE — DISCHARGE INSTR - ACTIVITY
No heavy lifting. May shower. Do not soak in tub. Do not drive while on pain medications. Avoid constipation. Take colace nightly (up to 300 mg) and miralax daily (up to three doses). Drink 64 ounces of water and take a powdered fiber supplement daily (ie-Metamucil). If you have signs of infection, call you doctor.  These include:   -Increased pain, swelling, warmth, or redness  -Red streaks leading from the incision  -Pus draining from the incision  -A fever > 100.4

## 2023-02-22 NOTE — PLAN OF CARE
Problem: Discharge Planning  Goal: Discharge to home or other facility with appropriate resources  2/22/2023 0955 by Linwood Pitt RN  Outcome: Adequate for Discharge  2/22/2023 6874 by Linwood Pitt RN  Outcome: Progressing  2/22/2023 0022 by Jennifer Vyas RN  Outcome: Progressing  Flowsheets (Taken 2/21/2023 1926)  Discharge to home or other facility with appropriate resources:   Identify barriers to discharge with patient and caregiver   Identify discharge learning needs (meds, wound care, etc)   Arrange for needed discharge resources and transportation as appropriate   Refer to discharge planning if patient needs post-hospital services based on physician order or complex needs related to functional status, cognitive ability or social support system     Problem: ABCDS Injury Assessment  Goal: Absence of physical injury  2/22/2023 0955 by Linwood Pitt RN  Outcome: Adequate for Discharge  2/22/2023 9975 by Linwood Pitt RN  Outcome: Progressing  2/22/2023 0022 by Jennifer Vyas RN  Outcome: Progressing  Flowsheets (Taken 2/22/2023 0020)  Absence of Physical Injury: Implement safety measures based on patient assessment     Problem: Pain  Goal: Verbalizes/displays adequate comfort level or baseline comfort level  2/22/2023 0955 by Linwood Pitt RN  Outcome: Adequate for Discharge  2/22/2023 0955 by Linwood Pitt RN  Outcome: Progressing  2/22/2023 0022 by Jennifer Vyas RN  Outcome: Progressing

## 2023-02-22 NOTE — DISCHARGE INSTR - DIET

## 2023-02-22 NOTE — PLAN OF CARE
Problem: Discharge Planning  Goal: Discharge to home or other facility with appropriate resources  2/22/2023 0955 by Johnathon Luque RN  Outcome: Completed  2/22/2023 0955 by Johnathon Luque RN  Outcome: Adequate for Discharge  2/22/2023 7151 by Johnathon Luque RN  Outcome: Progressing  2/22/2023 0022 by Saira Bunch RN  Outcome: Progressing  Flowsheets (Taken 2/21/2023 1926)  Discharge to home or other facility with appropriate resources:   Identify barriers to discharge with patient and caregiver   Identify discharge learning needs (meds, wound care, etc)   Arrange for needed discharge resources and transportation as appropriate   Refer to discharge planning if patient needs post-hospital services based on physician order or complex needs related to functional status, cognitive ability or social support system     Problem: ABCDS Injury Assessment  Goal: Absence of physical injury  2/22/2023 0955 by Johnathon Luque RN  Outcome: Completed  2/22/2023 0955 by Johnathon Luque RN  Outcome: Adequate for Discharge  2/22/2023 6090 by Johnathon Luque RN  Outcome: Progressing  2/22/2023 0022 by Saira Bunch RN  Outcome: Progressing  Flowsheets (Taken 2/22/2023 0020)  Absence of Physical Injury: Implement safety measures based on patient assessment     Problem: Pain  Goal: Verbalizes/displays adequate comfort level or baseline comfort level  2/22/2023 0955 by Johnathon Luque RN  Outcome: Completed  2/22/2023 0955 by Johnathon Luque RN  Outcome: Adequate for Discharge  2/22/2023 0955 by Johnathon Luque RN  Outcome: Progressing  2/22/2023 0022 by Saira Bunch RN  Outcome: Progressing

## 2023-02-23 LAB — URINE CULTURE, ROUTINE: NORMAL

## 2023-02-26 ASSESSMENT — ENCOUNTER SYMPTOMS
COLOR CHANGE: 0
BACK PAIN: 0
SHORTNESS OF BREATH: 0
NAUSEA: 1
EYE DISCHARGE: 0
ABDOMINAL PAIN: 1
RHINORRHEA: 0
COUGH: 0
VOMITING: 0

## 2023-02-27 NOTE — ED PROVIDER NOTES
[facility]  eMERGENCY dEPARTMENT eNCOUnter      Pt Name: Peggy Miller  MRN: [id number]  [place] 1950  Date of evaluation: 2/20/2023  Provider: Sophie Moon MD    [address] [place] [address]       Chief Complaint   Patient presents with    Abdominal Pain     RLQ and suprapubic pain x2 days         HISTORY OF PRESENT ILLNESS   (Location/Symptom, Timing/Onset,Context/Setting, Quality, Duration, Modifying Factors, Severity)  Note limiting factors. Peggy Miller is a 68 y.o. female who presents to the emergency department with abdominal pain. Patient has been experiencing abdominal pain for the past 2 days. Patient points to right lower quadrant and suprapubic area as source of pain. Patient states pain is better when she is lying flat and on her left side. Pain is worse with sitting up. Patient denies radiation of pain. Severity is 3 out of 10 while she is lying still. Patient denies removal of appendix in the past.  Associated symptoms include nausea and \"straining to urinate. \"  Patient denies vomiting, diarrhea, fever, flank pain, burning with urination or blood in urine. Patient states that she has also had a diminished appetite. HPI    NursingNotes were reviewed. REVIEW OF SYSTEMS    (2-9 systems for level 4, 10 or more for level 5)     Review of Systems   Constitutional:  Negative for chills and fever. HENT:  Negative for congestion and rhinorrhea. Eyes:  Negative for discharge. Respiratory:  Negative for cough and shortness of breath. Cardiovascular:  Negative for chest pain and palpitations. Gastrointestinal:  Positive for abdominal pain and nausea. Negative for vomiting. Genitourinary:  Positive for difficulty urinating. Negative for dysuria. Musculoskeletal:  Negative for back pain and neck pain. Skin:  Negative for color change and rash. Neurological:  Negative for syncope and light-headedness. Psychiatric/Behavioral:  Negative for agitation and confusion. All other systems reviewed and are negative. PAST MEDICALHISTORY     Past Medical History:   Diagnosis Date    Hyperlipidemia     Vitamin D deficiency          SURGICAL HISTORY       Past Surgical History:   Procedure Laterality Date    BREAST ENHANCEMENT SURGERY       SECTION      LAPAROSCOPIC APPENDECTOMY N/A 2023    LAPAROSCOPIC APPENDECTOMY AND REMOVAL OF LOWER RIGHT  ABDOMINAL SKIN LESION performed by Carlee Frazier DO at [facility]  Right     knee         CURRENT MEDICATIONS     Discharge Medication List as of 2023  9:39 AM        CONTINUE these medications which have NOT CHANGED    Details   atorvastatin (LIPITOR) 10 MG tablet Take 10 mg by mouth dailyHistorical Med      loratadine (CLARITIN) 10 MG capsule Take 10 mg by mouth dailyHistorical Med      vitamin D (ERGOCALCIFEROL) 1.25 MG (38391 UT) CAPS capsule Take 50,000 Units by mouth once a weekHistorical Med             ALLERGIES     Patient has no known allergies. FAMILY HISTORY     History reviewed. No pertinent family history.        SOCIAL HISTORY       Social History     Socioeconomic History    Marital status:      Spouse name: None    Number of children: None    Years of education: None    Highest education level: None   Tobacco Use    Smoking status: Never    Smokeless tobacco: Never   Substance and Sexual Activity    Alcohol use: Yes     Comment: rare    Drug use: Never       SCREENINGS    Palm Coma Scale  Eye Opening: Spontaneous  Best Verbal Response: Oriented  Best Motor Response: Obeys commands  Nithya Coma Scale Score: 15        PHYSICAL EXAM    (up to 7 for level 4, 8 or more for level 5)     ED Triage Vitals   BP Temp Temp Source Heart Rate Resp SpO2 Height Weight   23 0500 23   121/68 98.3 °F (36.8 °C) Temporal 89 16 94 % 5' 4\" (1.626 m) 170 lb (77.1 kg)       Physical Exam  Vitals and nursing note reviewed. Constitutional:       General: She is not in acute distress. Appearance: Normal appearance. HENT:      Head: Normocephalic and atraumatic. Right Ear: External ear normal.      Left Ear: External ear normal.      Nose: Nose normal. No congestion or rhinorrhea. Mouth/Throat:      Mouth: Mucous membranes are moist.      Pharynx: Oropharynx is clear. No oropharyngeal exudate or posterior oropharyngeal erythema. Eyes:      General: No scleral icterus. Conjunctiva/sclera: Conjunctivae normal.      Pupils: Pupils are equal, round, and reactive to light. Cardiovascular:      Rate and Rhythm: Normal rate and regular rhythm. Pulses: Normal pulses. Heart sounds: Normal heart sounds. Pulmonary:      Effort: Pulmonary effort is normal. No respiratory distress. Breath sounds: Normal breath sounds. No stridor. No wheezing, rhonchi or rales. Abdominal:      General: Bowel sounds are normal. There is no distension. Palpations: Abdomen is soft. Tenderness: There is abdominal tenderness (RLQ). There is guarding and rebound. Musculoskeletal:         General: No tenderness or deformity. Cervical back: Neck supple. No rigidity. Skin:     General: Skin is warm and dry. Capillary Refill: Capillary refill takes less than 2 seconds. Coloration: Skin is not jaundiced. Neurological:      General: No focal deficit present. Mental Status: She is alert and oriented to person, place, and time. Mental status is at baseline. Cranial Nerves: No cranial nerve deficit. Motor: No weakness.       Coordination: Coordination normal.   Psychiatric:         Mood and Affect: Mood normal.         Behavior: Behavior normal.       DIAGNOSTIC RESULTS     RADIOLOGY:  Non-plain film images such as CT, Ultrasound and MRI are read by the radiologist. Plain radiographic images are visualized and preliminarily interpreted bythe emergency physician with the below findings:          CT ABDOMEN PELVIS W IV CONTRAST Additional Contrast? None   Final Result   Appendicitis. No perforation or abscess. Communications:23 02:40 Verify Receipt Verified receipt with Dr. Malu Bocanegra on  02:38 (-06:00)Electronically signed by Finn Watson M.D. on 23 at [address]              LABS:  Labs Reviewed   CBC WITH AUTO DIFFERENTIAL - Abnormal; Notable for the following components:       Result Value    RBC 5.65 (*)     Hemoglobin 16.4 (*)     Hematocrit 48.9 (*)     Neutrophils % 78.9 (*)     Lymphocytes % 14.5 (*)     Neutrophils Absolute 8.2 (*)     All other components within normal limits   URINALYSIS WITH REFLEX TO CULTURE - Abnormal; Notable for the following components:    Clarity, UA CLOUDY (*)     Ketones, Urine TRACE (*)     Leukocyte Esterase, Urine SMALL (*)     All other components within normal limits   MICROSCOPIC URINALYSIS - Abnormal; Notable for the following components:    Bacteria, UA NEGATIVE (*)     Crystals, UA NEG (*)     WBC, UA 11 (*)     All other components within normal limits   COVID-19, RAPID   CULTURE, URINE    Narrative:     ORDER#: [id number]                          ORDERED BY: Nik Rudolph  SOURCE: Urine Clean Catch                  COLLECTED:  23 02:50  ANTIBIOTICS AT NESSA.:                      RECEIVED :  23 02:56   COMPREHENSIVE METABOLIC PANEL   LIPASE   SURGICAL PATHOLOGY    Narrative:                                          [facility]                                       [address], [place] [address]  Department of Pathology  FINAL SURGICAL PATHOLOGY REPORT  Patient Name:  Tom Coreas          Accession No:  [id number]   Age Sex:   1950    68 Y F        Pt Type: I     DIS 01                                             Location:  Account #      [de-identified]                 Collected:     2023  Med Rec #      [id number]                    Received: 02/22/2023  Attend Phys:   Nauntiana Fisherel                Completed:     02/23/2023  Perform Phys:  Cesar Panchal    FINAL DIAGNOSIS:    A. Appendix, appendectomy:  Severe acute appendicitis and  periappendicitis. B.  Skin, biopsy of lower abdominal lesion:  Benign papillomatous  seborrheic keratosis. CBG/CBG    PREOP DIAGNOSIS:  ACUTE APPENDICITIS, UNSPECIFIED ACUTE APPENDICITIS TYPE      SPECIMEN(S):  A. APPENDIX, NON-INCIDENTAL, APPENDIX  B. SKIN LESION, LOWER ABDOMINAL LESION    GROSS DESCRIPTION:   Received are two containers. A. Received is a container labeled \"appendix. \"  Received in fixative is a  6.2 x 1.0 x 0.9 cm appendix with a gray-tan serosa with prominent  vasculature. There is a remnant of a mesoappendix measuring  approximately 1.8 x 1.3 x 1.1 cm at the distal tip. The appendix wall  appears intact. Upon closer examination there appears to be a transmural  perforation approximately 1.4 cm from the distal appendiceal end. Representative sections are submitted in cassette A1, 4 pieces. B. Received is a container labeled \"lower abdominal lesion. \"  Received in  fixative is a brown, pigmented, variegated, 1.7 x 0.6 x 0.5 cm skin  lesion. The specimen is serially sectioned and totally submitted in  cassette B1, 5 pieces. EDNA/ALIE        CPT: [id number] X1   [id number] Jeff Sebastian MD  (Electronic Signature)  02/23/2023                                                                     Page 1 of 1       All other labs were within normal range or not returned as of this dictation.     EMERGENCY DEPARTMENT COURSE and DIFFERENTIAL DIAGNOSIS/MDM:   Vitals:    Vitals:    02/22/23 0650 02/22/23 0751 02/22/23 1136 02/22/23 1147   BP:  (!) 81/45 (!) 84/45 (!) 96/56   Pulse: 52 53 54    Resp: 18 18     Temp:  98.4 °F (36.9 °C)     TempSrc:  Temporal     SpO2: 91% 91% 92%    Weight:       Height:           MDM     Amount and/or Complexity of Data Reviewed  Clinical lab tests: reviewed    54-year-old female presents with right lower quadrant pain x2 days. Physical examination is consistent with acute appendicitis with guarding and rebound in right lower quadrant. Lab and radiology results reviewed. CT scan shows inflammation of patient's appendix with inflammatory stranding without evidence of perforation or abscess. Patient started on IV Zosyn and morphine. Discussed with Dr. Devon Bauer, general surgery. Patient will be admitted for appendectomy. Patient verbalizes understanding and agreement with plan of care. CONSULTS:  None    PROCEDURES:  Unless otherwise noted below, none     Procedures    FINAL IMPRESSION      1.  Acute appendicitis, unspecified acute appendicitis type          DISPOSITION/PLAN   DISPOSITION Admitted 02/21/2023 02:52:57 AM        (Please note that portions of this note were completed with a voice recognition program.  Efforts were made to edit thedictations but occasionally words are mis-transcribed.)    Maya Nogueira MD (electronically signed)  Attending Emergency Physician          Maya Nogueira MD  02/26/23 9998

## 2023-03-07 ENCOUNTER — OFFICE VISIT (OUTPATIENT)
Dept: SURGERY | Age: 73
End: 2023-03-07

## 2023-03-07 VITALS
HEIGHT: 64 IN | WEIGHT: 172.6 LBS | TEMPERATURE: 98.1 F | HEART RATE: 74 BPM | BODY MASS INDEX: 29.47 KG/M2 | OXYGEN SATURATION: 96 %

## 2023-03-07 DIAGNOSIS — Z09 POSTOPERATIVE EXAMINATION: Primary | ICD-10-CM

## 2023-03-07 NOTE — PROGRESS NOTES
Postop Progress Note    Subjective    Pancho Toussaint presents to the office for postop follow up 2 weeks s/p laparoscopic appendectomy. She is doing well. She has some right sided pain over the weekend that improved with stool softener and a BM. Overall she feels well. Objective    Vitals:    03/07/23 1105   Pulse: 74   Temp: 98.1 °F (36.7 °C)   SpO2: 96%     General: alert, cooperative and no distress  Incision: healing well    Pathology reviewed. Assessment  Doing well postoperatively. Plan  1. Continue any current medications  2. Wound care discussed  3. Pt is to increase activities as tolerated  4. Usual diet  5.  Follow up: as needed    Electronically signed by Claudell Cable, DO on 9/4/6128 at 11:17 AM

## 2025-05-12 ENCOUNTER — TRANSCRIBE ORDERS (OUTPATIENT)
Dept: ADMINISTRATIVE | Facility: HOSPITAL | Age: 75
End: 2025-05-12
Payer: MEDICARE

## 2025-05-12 DIAGNOSIS — Z12.31 ENCOUNTER FOR SCREENING MAMMOGRAM FOR MALIGNANT NEOPLASM OF BREAST: Primary | ICD-10-CM

## 2025-06-17 ENCOUNTER — HOSPITAL ENCOUNTER (OUTPATIENT)
Dept: MAMMOGRAPHY | Facility: HOSPITAL | Age: 75
Discharge: HOME | End: 2025-06-17
Admitting: FAMILY MEDICINE
Payer: MEDICARE

## 2025-06-17 DIAGNOSIS — Z12.31 ENCOUNTER FOR SCREENING MAMMOGRAM FOR MALIGNANT NEOPLASM OF BREAST: ICD-10-CM

## 2025-06-17 PROCEDURE — 77063 BREAST TOMOSYNTHESIS BI: CPT

## 2025-06-17 PROCEDURE — 77067 SCR MAMMO BI INCL CAD: CPT

## 2025-06-23 ENCOUNTER — HOSPITAL ENCOUNTER (OUTPATIENT)
Dept: ULTRASOUND IMAGING | Facility: HOSPITAL | Age: 75
Discharge: HOME | End: 2025-06-23
Payer: MEDICARE

## 2025-06-23 ENCOUNTER — HOSPITAL ENCOUNTER (OUTPATIENT)
Dept: MAMMOGRAPHY | Facility: HOSPITAL | Age: 75
Discharge: HOME | End: 2025-06-23
Payer: MEDICARE

## 2025-06-23 DIAGNOSIS — R92.8 ABNORMAL MAMMOGRAM: ICD-10-CM

## 2025-06-23 PROCEDURE — G0279 TOMOSYNTHESIS, MAMMO: HCPCS

## 2025-06-23 PROCEDURE — 77065 DX MAMMO INCL CAD UNI: CPT

## (undated) DEVICE — PUMP SUC IRR TBNG L10FT W/ HNDPC ASSEMB STRYKEFLOW 2

## (undated) DEVICE — CUTTER ENDOSCP L340MM LIN ARTC SGL STROKE FIRING ENDOPATH

## (undated) DEVICE — SUTURE SZ 0 27IN 5/8 CIR UR-6  TAPER PT VIOLET ABSRB VICRYL J603H

## (undated) DEVICE — GOWN,PREVENTION PLUS,XL,ST,24/CS: Brand: MEDLINE

## (undated) DEVICE — ADHESIVE SKIN CLSR 0.7ML TOP DERMBND ADV

## (undated) DEVICE — SOLUTION IV 500ML LAC RINGERS PH 6.5 INJ USP VIAFLX PLAS

## (undated) DEVICE — SOLUTION ANTIFOG VIS SYS CLEARIFY LAPSCP

## (undated) DEVICE — GLOVE SURG SZ 7 CRM LTX FREE POLYISOPRENE POLYMER BEAD ANTI

## (undated) DEVICE — BAG SPEC REM 224ML W4XL6IN DIA10MM 1 HND GYN DISP ENDOPCH

## (undated) DEVICE — TROCAR: Brand: KII FIOS FIRST ENTRY

## (undated) DEVICE — SUTURE VCRL SZ 0 L27IN ABSRB VLT L36MM UR-5 5/8 CIR J376H

## (undated) DEVICE — SOLUTION IV 1000ML 0.9% SOD CHL PH 5 INJ USP VIAFLX PLAS

## (undated) DEVICE — SUTURE MCRYL SZ 4-0 L18IN ABSRB UD L19MM PS-2 3/8 CIR PRIM Y496G

## (undated) DEVICE — DECANTER VI VENT W/ VLV FOR ASEP TRNSF OF FLD

## (undated) DEVICE — SOLUTION IV 1000ML 0.9% SOD CHL

## (undated) DEVICE — TROCAR: Brand: KII® SLEEVE

## (undated) DEVICE — GENERAL LAP CDS

## (undated) DEVICE — RELOAD STPL H1-2.5X45MM VASC THN TISS WHT 6 ROW B FRM SGL